# Patient Record
Sex: MALE | Race: WHITE | NOT HISPANIC OR LATINO | Employment: FULL TIME | URBAN - NONMETROPOLITAN AREA
[De-identification: names, ages, dates, MRNs, and addresses within clinical notes are randomized per-mention and may not be internally consistent; named-entity substitution may affect disease eponyms.]

---

## 2021-10-14 ENCOUNTER — HOSPITAL ENCOUNTER (EMERGENCY)
Facility: HOSPITAL | Age: 36
Discharge: HOME | End: 2021-10-14
Attending: EMERGENCY MEDICINE | Admitting: EMERGENCY MEDICINE

## 2021-10-14 VITALS
WEIGHT: 269.1 LBS | HEART RATE: 79 BPM | TEMPERATURE: 98.2 F | BODY MASS INDEX: 34.53 KG/M2 | RESPIRATION RATE: 18 BRPM | DIASTOLIC BLOOD PRESSURE: 87 MMHG | HEIGHT: 74 IN | OXYGEN SATURATION: 98 % | SYSTOLIC BLOOD PRESSURE: 136 MMHG

## 2021-10-14 DIAGNOSIS — S81.012A KNEE LACERATION, LEFT, INITIAL ENCOUNTER: Primary | ICD-10-CM

## 2021-10-14 PROCEDURE — 90715 TDAP VACCINE 7 YRS/> IM: CPT | Performed by: EMERGENCY MEDICINE

## 2021-10-14 PROCEDURE — 90471 IMMUNIZATION ADMIN: CPT | Performed by: EMERGENCY MEDICINE

## 2021-10-14 PROCEDURE — 99282 EMERGENCY DEPT VISIT SF MDM: CPT

## 2021-10-14 PROCEDURE — 25010000002 TDAP 5-2.5-18.5 LF-MCG/0.5 SUSPENSION: Performed by: EMERGENCY MEDICINE

## 2021-10-14 RX ORDER — DIAPER,BRIEF,INFANT-TODD,DISP
1 EACH MISCELLANEOUS ONCE
Status: COMPLETED | OUTPATIENT
Start: 2021-10-14 | End: 2021-10-14

## 2021-10-14 RX ORDER — LIDOCAINE HYDROCHLORIDE AND EPINEPHRINE 10; 10 MG/ML; UG/ML
10 INJECTION, SOLUTION INFILTRATION; PERINEURAL ONCE
Status: COMPLETED | OUTPATIENT
Start: 2021-10-14 | End: 2021-10-14

## 2021-10-14 RX ADMIN — TETANUS TOXOID, REDUCED DIPHTHERIA TOXOID AND ACELLULAR PERTUSSIS VACCINE, ADSORBED 0.5 ML: 5; 2.5; 8; 8; 2.5 SUSPENSION INTRAMUSCULAR at 22:45

## 2021-10-14 RX ADMIN — LIDOCAINE HYDROCHLORIDE AND EPINEPHRINE 10 ML: 10; 10 INJECTION, SOLUTION INFILTRATION; PERINEURAL at 22:44

## 2021-10-14 RX ADMIN — BACITRACIN 1 APPLICATION: 500 OINTMENT TOPICAL at 23:01

## 2021-10-15 NOTE — ED PROVIDER NOTES
Subjective   35-year-old white male presents to the emergency department chief complaint of extremity laceration.  Patient relates he was changing a light bulb approximately 1 hour prior to arrival and the glass lamp shade broke causing a laceration to his left knee.  Patient relates he feels well otherwise.  Patient is uncertain when his last tetanus immunization was.          Review of Systems   Constitutional: Negative for chills, diaphoresis and fever.   Respiratory: Negative for cough and shortness of breath.    Cardiovascular: Negative for chest pain.   Gastrointestinal: Negative for abdominal pain, nausea and vomiting.   Genitourinary: Negative for dysuria.   Musculoskeletal: Negative for back pain and neck pain.   Skin: Positive for wound.   Neurological: Negative for syncope, weakness, numbness and headaches.   All other systems reviewed and are negative.      No past medical history on file.    No Known Allergies    No past surgical history on file.    No family history on file.    Social History     Socioeconomic History   • Marital status: Single           Objective   Physical Exam  Vitals and nursing note reviewed.   Constitutional:       General: He is not in acute distress.     Appearance: He is not toxic-appearing or diaphoretic.   HENT:      Head: Normocephalic and atraumatic.      Right Ear: External ear normal.      Left Ear: External ear normal.      Nose: Nose normal.      Mouth/Throat:      Mouth: Mucous membranes are moist.      Pharynx: Oropharynx is clear.   Eyes:      Extraocular Movements: Extraocular movements intact.      Conjunctiva/sclera: Conjunctivae normal.      Pupils: Pupils are equal, round, and reactive to light.   Cardiovascular:      Rate and Rhythm: Normal rate and regular rhythm.      Pulses: Normal pulses.      Heart sounds: Normal heart sounds.   Pulmonary:      Effort: Pulmonary effort is normal.      Breath sounds: Normal breath sounds.   Abdominal:      General: Bowel  sounds are normal. There is no distension.      Palpations: Abdomen is soft.      Tenderness: There is no abdominal tenderness.   Musculoskeletal:      Cervical back: Normal range of motion and neck supple.      Comments: 4 cm laceration medial aspect of left knee.  The laceration extends into the subcutaneous adipose tissue.  The laceration does not involve the knee joint.  No tendon injury.  Full range of motion.  Neurovascular intact distally.   Skin:     General: Skin is warm and dry.   Neurological:      Mental Status: He is alert and oriented to person, place, and time.      Sensory: No sensory deficit.      Motor: No weakness.   Psychiatric:         Mood and Affect: Mood normal.         Behavior: Behavior normal.         Laceration Repair    Date/Time: 10/14/2021 10:35 PM  Performed by: Chace Ramesh MD  Authorized by: Chace Ramesh MD     Consent:     Consent obtained:  Verbal    Consent given by:  Patient    Risks discussed:  Infection, pain, retained foreign body, need for additional repair, poor cosmetic result, tendon damage, vascular damage, poor wound healing and nerve damage  Laceration details:     Location:  Leg    Leg location:  L knee    Length (cm):  4    Depth (mm):  2  Pre-procedure details:     Preparation:  Patient was prepped and draped in usual sterile fashion  Exploration:     Hemostasis achieved with:  Epinephrine    Wound exploration: wound explored through full range of motion and entire depth of wound probed and visualized      Wound extent: no foreign bodies/material noted, no muscle damage noted, no nerve damage noted, no tendon damage noted, no underlying fracture noted and no vascular damage noted      Contaminated: no    Treatment:     Area cleansed with:  Hibiclens    Irrigation solution:  Sterile saline    Irrigation method:  Syringe  Skin repair:     Repair method:  Sutures    Suture size:  4-0    Suture material:  Nylon    Suture technique:  Running    Number of sutures:   1  Approximation:     Approximation:  Close  Post-procedure details:     Dressing:  Antibiotic ointment and sterile dressing    Patient tolerance of procedure:  Tolerated well, no immediate complications  Comments:      Patient refused x-ray to rule out foreign body.               ED Course  ED Course as of 10/14/21 2249   Thu Oct 14, 2021   2248 Wound care instructions discussed with the patient.  I recommended primary care follow-up in 2 weeks for suture removal.  I advised him return to the emergency department if any signs of infection or any concerns. [DR]      ED Course User Index  [DR] Chace Ramesh MD                                           [facility]    Final diagnoses:   Knee laceration, left, initial encounter       ED Disposition  ED Disposition     ED Disposition Condition Comment    Discharge Stable           [facility]  [address] [place] [id number]  Schedule an appointment as soon as possible for a visit in 2 weeks  CALL  [phone], For suture removal         Medication List      No changes were made to your prescriptions during this visit.          Chace Ramesh MD  10/14/21 5389

## 2022-11-18 ENCOUNTER — LAB REQUISITION (OUTPATIENT)
Dept: LAB | Facility: HOSPITAL | Age: 37
End: 2022-11-18

## 2022-11-18 DIAGNOSIS — Z30.2 ENCOUNTER FOR STERILIZATION: ICD-10-CM

## 2022-11-21 LAB — REF LAB TEST METHOD: NORMAL

## 2023-06-10 ENCOUNTER — APPOINTMENT (OUTPATIENT)
Dept: GENERAL RADIOLOGY | Facility: HOSPITAL | Age: 38
End: 2023-06-10
Payer: OTHER GOVERNMENT

## 2023-06-10 ENCOUNTER — HOSPITAL ENCOUNTER (EMERGENCY)
Facility: HOSPITAL | Age: 38
Discharge: HOME | End: 2023-06-10
Attending: FAMILY MEDICINE
Payer: OTHER GOVERNMENT

## 2023-06-10 VITALS
TEMPERATURE: 97.8 F | OXYGEN SATURATION: 98 % | WEIGHT: 235 LBS | RESPIRATION RATE: 20 BRPM | BODY MASS INDEX: 30.16 KG/M2 | HEIGHT: 74 IN | HEART RATE: 75 BPM | DIASTOLIC BLOOD PRESSURE: 92 MMHG | SYSTOLIC BLOOD PRESSURE: 150 MMHG

## 2023-06-10 DIAGNOSIS — M25.561 ACUTE PAIN OF RIGHT KNEE: Primary | ICD-10-CM

## 2023-06-10 PROCEDURE — 99283 EMERGENCY DEPT VISIT LOW MDM: CPT

## 2023-06-10 PROCEDURE — 73564 X-RAY EXAM KNEE 4 OR MORE: CPT

## 2023-06-10 PROCEDURE — 73610 X-RAY EXAM OF ANKLE: CPT

## 2023-06-10 RX ORDER — DICLOFENAC SODIUM 75 MG/1
75 TABLET, DELAYED RELEASE ORAL 2 TIMES DAILY
Qty: 14 TABLET | Refills: 0 | Status: SHIPPED | OUTPATIENT
Start: 2023-06-10 | End: 2023-06-17

## 2023-06-10 RX ORDER — HYDROCODONE BITARTRATE AND ACETAMINOPHEN 5; 325 MG/1; MG/1
1 TABLET ORAL EVERY 6 HOURS PRN
Qty: 12 TABLET | Refills: 0 | Status: SHIPPED | OUTPATIENT
Start: 2023-06-10 | End: 2023-06-13

## 2023-06-10 RX ORDER — DEXTROAMPHETAMINE SACCHARATE, AMPHETAMINE ASPARTATE, DEXTROAMPHETAMINE SULFATE AND AMPHETAMINE SULFATE 5; 5; 5; 5 MG/1; MG/1; MG/1; MG/1
20 TABLET ORAL DAILY
COMMUNITY

## 2023-06-10 RX ORDER — HYDROCODONE BITARTRATE AND ACETAMINOPHEN 5; 325 MG/1; MG/1
1 TABLET ORAL ONCE
Status: COMPLETED | OUTPATIENT
Start: 2023-06-10 | End: 2023-06-10

## 2023-06-10 RX ORDER — ERGOCALCIFEROL 1.25 MG/1
50000 CAPSULE ORAL DAILY
COMMUNITY

## 2023-06-10 RX ORDER — BUPROPION HYDROCHLORIDE 150 MG/1
150 TABLET ORAL DAILY
COMMUNITY

## 2023-06-10 RX ADMIN — HYDROCODONE BITARTRATE AND ACETAMINOPHEN 1 TABLET: 5; 325 TABLET ORAL at 12:37

## 2023-06-10 NOTE — ED PROVIDER NOTES
Subjective   History of Present Illness  Pt in the ED with right knee and ankle pain. Reports last night stepped off a trailer and felt his knee catch causing him to strike the ground. Has had some surgical repair to right ankle as well     History provided by:  Patient   used: No    Knee Pain  Location:  Knee  Time since incident:  1 day  Injury: yes    Mechanism of injury: fall    Knee location:  R knee  Pain details:     Radiates to:  Does not radiate    Severity:  Moderate    Onset quality:  Gradual    Duration:  1 day    Timing:  Constant  Chronicity:  New  Dislocation: no    Prior injury to area:  Yes  Relieved by:  Nothing  Worsened by:  Nothing  Ineffective treatments:  None tried  Associated symptoms: decreased ROM      Review of Systems   Constitutional: Negative.    HENT: Negative.     Eyes: Negative.    Respiratory: Negative.     Cardiovascular: Negative.    Gastrointestinal: Negative.    Genitourinary: Negative.    Musculoskeletal:  Positive for arthralgias, gait problem and joint swelling.   Skin: Negative.    Hematological: Negative.    Psychiatric/Behavioral: Negative.     All other systems reviewed and are negative.    Past Medical History:   Diagnosis Date    ADHD        No Known Allergies    Past Surgical History:   Procedure Laterality Date    ADENOIDECTOMY      ORIF ANKLE FRACTURE         No family history on file.    Social History     Socioeconomic History    Marital status: Single   Tobacco Use    Smoking status: Former     Types: Cigarettes    Smokeless tobacco: Never    Tobacco comments:     Using nicotine patches   Substance and Sexual Activity    Alcohol use: Yes     Alcohol/week: 2.0 standard drinks     Types: 2 Cans of beer per week    Drug use: Never           Objective   Physical Exam  Vitals and nursing note reviewed.   Constitutional:       Appearance: He is well-developed.   HENT:      Head: Normocephalic.      Nose: Nose normal.   Eyes:      Conjunctiva/sclera:  Conjunctivae normal.      Pupils: Pupils are equal, round, and reactive to light.   Cardiovascular:      Rate and Rhythm: Normal rate and regular rhythm.      Heart sounds: Normal heart sounds.   Pulmonary:      Effort: Pulmonary effort is normal.      Breath sounds: Normal breath sounds.   Abdominal:      Palpations: Abdomen is soft.   Musculoskeletal:         General: Swelling, tenderness and signs of injury present.      Cervical back: Normal range of motion.        Legs:    Skin:     General: Skin is warm and dry.   Neurological:      Mental Status: He is alert and oriented to person, place, and time.      GCS: GCS eye subscore is 4. GCS verbal subscore is 5. GCS motor subscore is 6.       Procedures           ED Course           XR Knee 4+ View Right   Final Result   No significant radiographic abnormality.      XR Ankle 3+ View Right   Final Result                                          Medical Decision Making  Pt in the ED as noted in hpi. Differentials include sprain, contusion, dislocation as well as others. Pt unable to bear weight on right leg. Plain films negative. Knee hinge brace, crutches, nsaids and pain meds as needed. [facility]     Amount and/or Complexity of Data Reviewed  Radiology: ordered and independent interpretation performed. Decision-making details documented in ED Course.    Risk  OTC drugs.  Prescription drug management.        Final diagnoses:   Acute pain of right knee       ED Disposition  ED Disposition       ED Disposition   Discharge    Condition   Stable    Comment   --               Provider, No Known  [facility] [address]    Call in 1 week           Medication List        New Prescriptions      diclofenac 75 MG EC tablet  Commonly known as: VOLTAREN  Take 1 tablet by mouth 2 (Two) Times a Day for 7 days.     HYDROcodone-acetaminophen 5-325 MG per tablet  Commonly known as: NORCO  Take 1 tablet by mouth Every 6 (Six) Hours As Needed for  Moderate Pain for up to 3 days.               Where to Get Your Medications        These medications were sent to "[id number] - [place], [place] - [address] AT [facility] [facility] [address] - [phone]  - [phone]   [address] [address]      Phone: [phone]   diclofenac 75 MG EC tablet  HYDROcodone-acetaminophen 5-325 MG per tablet            Glen Noriega, APRN  06/10/23 1330

## 2025-07-08 NOTE — ED TRIAGE NOTES
Pt states was changing lightbulb approximately one hour ago when the glass lamp shade broke and cut inner left knee area. Bleeding controlled on arrival with bandaid. Tetanus up to date.   Yes